# Patient Record
Sex: MALE | Race: WHITE | NOT HISPANIC OR LATINO | URBAN - METROPOLITAN AREA
[De-identification: names, ages, dates, MRNs, and addresses within clinical notes are randomized per-mention and may not be internally consistent; named-entity substitution may affect disease eponyms.]

---

## 2020-01-21 ENCOUNTER — EMERGENCY (EMERGENCY)
Facility: HOSPITAL | Age: 55
LOS: 1 days | Discharge: HOME | End: 2020-01-21
Attending: EMERGENCY MEDICINE | Admitting: EMERGENCY MEDICINE
Payer: COMMERCIAL

## 2020-01-21 VITALS
RESPIRATION RATE: 20 BRPM | SYSTOLIC BLOOD PRESSURE: 155 MMHG | OXYGEN SATURATION: 96 % | DIASTOLIC BLOOD PRESSURE: 94 MMHG | WEIGHT: 220.02 LBS | HEART RATE: 146 BPM | HEIGHT: 70 IN | TEMPERATURE: 103 F

## 2020-01-21 LAB
ALBUMIN SERPL ELPH-MCNC: 3.6 G/DL — SIGNIFICANT CHANGE UP (ref 3.4–5)
ALP SERPL-CCNC: 89 U/L — SIGNIFICANT CHANGE UP (ref 40–120)
ALT FLD-CCNC: 34 U/L — SIGNIFICANT CHANGE UP (ref 12–42)
ANION GAP SERPL CALC-SCNC: 13 MMOL/L — SIGNIFICANT CHANGE UP (ref 9–16)
APPEARANCE UR: CLEAR — SIGNIFICANT CHANGE UP
APTT BLD: 30.6 SEC — SIGNIFICANT CHANGE UP (ref 27.5–36.3)
AST SERPL-CCNC: 22 U/L — SIGNIFICANT CHANGE UP (ref 15–37)
BASOPHILS # BLD AUTO: 0.03 K/UL — SIGNIFICANT CHANGE UP (ref 0–0.2)
BASOPHILS NFR BLD AUTO: 0.2 % — SIGNIFICANT CHANGE UP (ref 0–2)
BILIRUB SERPL-MCNC: 0.9 MG/DL — SIGNIFICANT CHANGE UP (ref 0.2–1.2)
BILIRUB UR-MCNC: NEGATIVE — SIGNIFICANT CHANGE UP
BUN SERPL-MCNC: 22 MG/DL — SIGNIFICANT CHANGE UP (ref 7–23)
CALCIUM SERPL-MCNC: 9 MG/DL — SIGNIFICANT CHANGE UP (ref 8.5–10.5)
CHLORIDE SERPL-SCNC: 99 MMOL/L — SIGNIFICANT CHANGE UP (ref 96–108)
CO2 SERPL-SCNC: 23 MMOL/L — SIGNIFICANT CHANGE UP (ref 22–31)
COLOR SPEC: YELLOW — SIGNIFICANT CHANGE UP
CREAT SERPL-MCNC: 1.33 MG/DL — HIGH (ref 0.5–1.3)
D DIMER BLD IA.RAPID-MCNC: 216 NG/ML DDU — SIGNIFICANT CHANGE UP
DIFF PNL FLD: ABNORMAL
EOSINOPHIL # BLD AUTO: 0.03 K/UL — SIGNIFICANT CHANGE UP (ref 0–0.5)
EOSINOPHIL NFR BLD AUTO: 0.2 % — SIGNIFICANT CHANGE UP (ref 0–6)
FLU A RESULT: SIGNIFICANT CHANGE UP
FLU A RESULT: SIGNIFICANT CHANGE UP
FLUAV AG NPH QL: SIGNIFICANT CHANGE UP
FLUBV AG NPH QL: SIGNIFICANT CHANGE UP
GLUCOSE SERPL-MCNC: 96 MG/DL — SIGNIFICANT CHANGE UP (ref 70–99)
GLUCOSE UR QL: NEGATIVE — SIGNIFICANT CHANGE UP
HCT VFR BLD CALC: 39.5 % — SIGNIFICANT CHANGE UP (ref 39–50)
HGB BLD-MCNC: 13.8 G/DL — SIGNIFICANT CHANGE UP (ref 13–17)
IMM GRANULOCYTES NFR BLD AUTO: 0.4 % — SIGNIFICANT CHANGE UP (ref 0–1.5)
INR BLD: 1.17 — HIGH (ref 0.88–1.16)
KETONES UR-MCNC: 15 MG/DL
LACTATE SERPL-SCNC: 1 MMOL/L — SIGNIFICANT CHANGE UP (ref 0.4–2)
LEUKOCYTE ESTERASE UR-ACNC: NEGATIVE — SIGNIFICANT CHANGE UP
LYMPHOCYTES # BLD AUTO: 0.77 K/UL — LOW (ref 1–3.3)
LYMPHOCYTES # BLD AUTO: 4.2 % — LOW (ref 13–44)
MCHC RBC-ENTMCNC: 29.6 PG — SIGNIFICANT CHANGE UP (ref 27–34)
MCHC RBC-ENTMCNC: 34.9 GM/DL — SIGNIFICANT CHANGE UP (ref 32–36)
MCV RBC AUTO: 84.6 FL — SIGNIFICANT CHANGE UP (ref 80–100)
MONOCYTES # BLD AUTO: 0.88 K/UL — SIGNIFICANT CHANGE UP (ref 0–0.9)
MONOCYTES NFR BLD AUTO: 4.8 % — SIGNIFICANT CHANGE UP (ref 2–14)
NEUTROPHILS # BLD AUTO: 16.51 K/UL — HIGH (ref 1.8–7.4)
NEUTROPHILS NFR BLD AUTO: 90.2 % — HIGH (ref 43–77)
NITRITE UR-MCNC: NEGATIVE — SIGNIFICANT CHANGE UP
NRBC # BLD: 0 /100 WBCS — SIGNIFICANT CHANGE UP (ref 0–0)
PH UR: 6 — SIGNIFICANT CHANGE UP (ref 5–8)
PLATELET # BLD AUTO: 377 K/UL — SIGNIFICANT CHANGE UP (ref 150–400)
POTASSIUM SERPL-MCNC: 4.1 MMOL/L — SIGNIFICANT CHANGE UP (ref 3.5–5.3)
POTASSIUM SERPL-SCNC: 4.1 MMOL/L — SIGNIFICANT CHANGE UP (ref 3.5–5.3)
PROT SERPL-MCNC: 8.1 G/DL — SIGNIFICANT CHANGE UP (ref 6.4–8.2)
PROT UR-MCNC: NEGATIVE MG/DL — SIGNIFICANT CHANGE UP
PROTHROM AB SERPL-ACNC: 12.9 SEC — SIGNIFICANT CHANGE UP (ref 10–12.9)
RBC # BLD: 4.67 M/UL — SIGNIFICANT CHANGE UP (ref 4.2–5.8)
RBC # FLD: 12.1 % — SIGNIFICANT CHANGE UP (ref 10.3–14.5)
RSV RESULT: SIGNIFICANT CHANGE UP
RSV RNA RESP QL NAA+PROBE: SIGNIFICANT CHANGE UP
SODIUM SERPL-SCNC: 135 MMOL/L — SIGNIFICANT CHANGE UP (ref 132–145)
SP GR SPEC: <=1.005 — SIGNIFICANT CHANGE UP (ref 1–1.03)
UROBILINOGEN FLD QL: 0.2 E.U./DL — SIGNIFICANT CHANGE UP
WBC # BLD: 18.29 K/UL — HIGH (ref 3.8–10.5)
WBC # FLD AUTO: 18.29 K/UL — HIGH (ref 3.8–10.5)

## 2020-01-21 PROCEDURE — 93010 ELECTROCARDIOGRAM REPORT: CPT

## 2020-01-21 PROCEDURE — 71046 X-RAY EXAM CHEST 2 VIEWS: CPT | Mod: 26

## 2020-01-21 PROCEDURE — 99218: CPT | Mod: 25

## 2020-01-21 RX ORDER — ACETAMINOPHEN 500 MG
975 TABLET ORAL ONCE
Refills: 0 | Status: COMPLETED | OUTPATIENT
Start: 2020-01-21 | End: 2020-01-21

## 2020-01-21 RX ORDER — SODIUM CHLORIDE 9 MG/ML
1000 INJECTION INTRAMUSCULAR; INTRAVENOUS; SUBCUTANEOUS ONCE
Refills: 0 | Status: COMPLETED | OUTPATIENT
Start: 2020-01-21 | End: 2020-01-21

## 2020-01-21 RX ORDER — CEFTRIAXONE 500 MG/1
1000 INJECTION, POWDER, FOR SOLUTION INTRAMUSCULAR; INTRAVENOUS ONCE
Refills: 0 | Status: COMPLETED | OUTPATIENT
Start: 2020-01-21 | End: 2020-01-21

## 2020-01-21 RX ORDER — SODIUM CHLORIDE 9 MG/ML
3000 INJECTION INTRAMUSCULAR; INTRAVENOUS; SUBCUTANEOUS ONCE
Refills: 0 | Status: COMPLETED | OUTPATIENT
Start: 2020-01-21 | End: 2020-01-21

## 2020-01-21 RX ORDER — VANCOMYCIN HCL 1 G
1500 VIAL (EA) INTRAVENOUS ONCE
Refills: 0 | Status: COMPLETED | OUTPATIENT
Start: 2020-01-21 | End: 2020-01-21

## 2020-01-21 RX ADMIN — SODIUM CHLORIDE 3000 MILLILITER(S): 9 INJECTION INTRAMUSCULAR; INTRAVENOUS; SUBCUTANEOUS at 19:56

## 2020-01-21 RX ADMIN — CEFTRIAXONE 100 MILLIGRAM(S): 500 INJECTION, POWDER, FOR SOLUTION INTRAMUSCULAR; INTRAVENOUS at 19:46

## 2020-01-21 RX ADMIN — SODIUM CHLORIDE 250 MILLILITER(S): 9 INJECTION INTRAMUSCULAR; INTRAVENOUS; SUBCUTANEOUS at 22:48

## 2020-01-21 RX ADMIN — Medication 975 MILLIGRAM(S): at 19:57

## 2020-01-21 RX ADMIN — CEFTRIAXONE 1000 MILLIGRAM(S): 500 INJECTION, POWDER, FOR SOLUTION INTRAMUSCULAR; INTRAVENOUS at 20:49

## 2020-01-21 RX ADMIN — SODIUM CHLORIDE 3000 MILLILITER(S): 9 INJECTION INTRAMUSCULAR; INTRAVENOUS; SUBCUTANEOUS at 20:49

## 2020-01-21 RX ADMIN — SODIUM CHLORIDE 1000 MILLILITER(S): 9 INJECTION INTRAMUSCULAR; INTRAVENOUS; SUBCUTANEOUS at 22:48

## 2020-01-21 RX ADMIN — Medication 1500 MILLIGRAM(S): at 22:29

## 2020-01-21 RX ADMIN — Medication 975 MILLIGRAM(S): at 19:47

## 2020-01-21 RX ADMIN — Medication 300 MILLIGRAM(S): at 20:59

## 2020-01-21 NOTE — ED PROVIDER NOTE - SKIN, MLM
Skin normal color for race, warm, dry and intact. superficial abrasion over left lateral malleolus with 10x8cm of surrounding erythema, swelling, and warmth, noncircumferential, no crepitus. outlined.

## 2020-01-21 NOTE — ED CDU PROVIDER INITIAL DAY NOTE - MEDICAL DECISION MAKING DETAILS
pt presents c/o LLE redness with fever. brought into ED septic with fever and tachycardia. sepsis initiated. labs significant for elevated WBC but otherwise nonacute. given vanco/ceftriaxone and sepsis fluids. flu negative. cxr negative.

## 2020-01-21 NOTE — ED CDU PROVIDER INITIAL DAY NOTE - PROGRESS NOTE DETAILS
pain well controlled, cellulitic site contained within marked region, given LLE cellulitis, will change IV abx to vanco/unasyn, dose of toradol, will repeat labs and continue to monitor

## 2020-01-21 NOTE — ED PROVIDER NOTE - DIAGNOSTIC INTERPRETATION
Chest x-ray interpreted by KYLIE PA: Kristan White  Findings: heart size normal, possible right middle lobe infiltrates, lungs fully expanded, soft tissues appear normal. Chest x-ray interpreted by KYLIE PA: Kristan White  Findings: heart size normal, no infiltrates, lungs fully expanded, soft tissues appear normal.

## 2020-01-21 NOTE — ED PROVIDER NOTE - ATTENDING CONTRIBUTION TO CARE
Patient presenting with LLE redness x a few days. Febrile. Tachy. + ~  hand sized area of light red cellulitis to LLE, no crepitus, no fluctuance, no tenderness above redness. Sepsis [place] initiated. Lactate 1.0, HR coming down. Given CTX/Vanco. Placed on obs for second doses and clinical reassessment.

## 2020-01-21 NOTE — ED PROVIDER NOTE - MUSCULOSKELETAL, MLM
Spine appears normal, range of motion is not limited, no muscle or joint tenderness. no calf tenderness. 2+ dp/pt pulses equal bilat.

## 2020-01-21 NOTE — ED PROVIDER NOTE - OBJECTIVE STATEMENT
53yo M with h/o HTN (on amlodipine) presents today c/o left leg redness and swelling. pt was seen at urgent care where he was noted to be tachycardic and have low SpO2 and was sent to the ED. pt notes the redness started yesterday after picking at some dry skin on his foot. today he developed fever and chills along with tenderness to the area. pt denies any IVDU or injuries. denies any recent travel, surgery, h/o dvt or pe, ca, smoking, any other concerns. denies nausea, vomiting, cp, sob. does note that he has been fighting a cold for the last several days with productive cough.

## 2020-01-21 NOTE — ED PROVIDER NOTE - CARE PLAN
Principal Discharge DX:	Sepsis, due to unspecified organism, unspecified whether acute organ dysfunction present  Secondary Diagnosis:	Cellulitis of left lower extremity

## 2020-01-21 NOTE — ED CDU PROVIDER INITIAL DAY NOTE - DIAGNOSTIC INTERPRETATION
Chest x-ray interpreted by KYLIE PA: Kristan White  Findings: heart size normal, no infiltrates, lungs fully expanded, soft tissues appear normal.

## 2020-01-21 NOTE — ED ADULT NURSE NOTE - CHPI ED NUR SYMPTOMS NEG
no stiffness/no deformity/no tingling/no difficulty bearing weight/no abrasion/no bruising/no weakness/no back pain/no numbness

## 2020-01-21 NOTE — ED PROVIDER NOTE - CLINICAL SUMMARY MEDICAL DECISION MAKING FREE TEXT BOX
pt presents c/o fever, chills, cough, and cellulitis to ankle. pt told by UC that because he was tachycardic he may have a PE although more likely this is related to his fever as pt was septic upon arrival, likely 2/2 cellulitis, although cxr shows possible infiltrate as well. pt given ceftriaxone and vanco as well as weight based fluids. lactate negative. HR improving with fluids. BP stable. fever improved with tylenol. will obs for symptomatic relief, monitoring of infection progress, second round of IV abx.

## 2020-01-21 NOTE — ED PROVIDER NOTE - PROGRESS NOTE DETAILS
rechecked pt - discussed all results. cxr pending. negative lactate. hr improving after fluids. given that pt met sepsis criteria, will obs for second dose of IV abx and reassessment.

## 2020-01-22 VITALS
TEMPERATURE: 98 F | OXYGEN SATURATION: 98 % | DIASTOLIC BLOOD PRESSURE: 70 MMHG | SYSTOLIC BLOOD PRESSURE: 130 MMHG | HEART RATE: 80 BPM | RESPIRATION RATE: 18 BRPM

## 2020-01-22 LAB
BASOPHILS # BLD AUTO: 0.03 K/UL — SIGNIFICANT CHANGE UP (ref 0–0.2)
BASOPHILS NFR BLD AUTO: 0.2 % — SIGNIFICANT CHANGE UP (ref 0–2)
CRP SERPL-MCNC: >12 MG/DL — HIGH (ref 0–0.9)
EOSINOPHIL # BLD AUTO: 0.21 K/UL — SIGNIFICANT CHANGE UP (ref 0–0.5)
EOSINOPHIL NFR BLD AUTO: 1.4 % — SIGNIFICANT CHANGE UP (ref 0–6)
HCT VFR BLD CALC: 34.9 % — LOW (ref 39–50)
HGB BLD-MCNC: 12.1 G/DL — LOW (ref 13–17)
IMM GRANULOCYTES NFR BLD AUTO: 0.3 % — SIGNIFICANT CHANGE UP (ref 0–1.5)
LYMPHOCYTES # BLD AUTO: 1.54 K/UL — SIGNIFICANT CHANGE UP (ref 1–3.3)
LYMPHOCYTES # BLD AUTO: 9.9 % — LOW (ref 13–44)
MCHC RBC-ENTMCNC: 29.9 PG — SIGNIFICANT CHANGE UP (ref 27–34)
MCHC RBC-ENTMCNC: 34.7 GM/DL — SIGNIFICANT CHANGE UP (ref 32–36)
MCV RBC AUTO: 86.2 FL — SIGNIFICANT CHANGE UP (ref 80–100)
MONOCYTES # BLD AUTO: 1.13 K/UL — HIGH (ref 0–0.9)
MONOCYTES NFR BLD AUTO: 7.3 % — SIGNIFICANT CHANGE UP (ref 2–14)
NEUTROPHILS # BLD AUTO: 12.53 K/UL — HIGH (ref 1.8–7.4)
NEUTROPHILS NFR BLD AUTO: 80.9 % — HIGH (ref 43–77)
NRBC # BLD: 0 /100 WBCS — SIGNIFICANT CHANGE UP (ref 0–0)
PLATELET # BLD AUTO: 283 K/UL — SIGNIFICANT CHANGE UP (ref 150–400)
RBC # BLD: 4.05 M/UL — LOW (ref 4.2–5.8)
RBC # FLD: 12.2 % — SIGNIFICANT CHANGE UP (ref 10.3–14.5)
WBC # BLD: 15.49 K/UL — HIGH (ref 3.8–10.5)
WBC # FLD AUTO: 15.49 K/UL — HIGH (ref 3.8–10.5)

## 2020-01-22 PROCEDURE — 99217: CPT | Mod: 25

## 2020-01-22 RX ORDER — IBUPROFEN 200 MG
1 TABLET ORAL
Qty: 20 | Refills: 0
Start: 2020-01-22

## 2020-01-22 RX ORDER — AMPICILLIN SODIUM AND SULBACTAM SODIUM 250; 125 MG/ML; MG/ML
3 INJECTION, POWDER, FOR SUSPENSION INTRAMUSCULAR; INTRAVENOUS EVERY 6 HOURS
Refills: 0 | Status: DISCONTINUED | OUTPATIENT
Start: 2020-01-22 | End: 2020-02-04

## 2020-01-22 RX ORDER — KETOROLAC TROMETHAMINE 30 MG/ML
30 SYRINGE (ML) INJECTION ONCE
Refills: 0 | Status: DISCONTINUED | OUTPATIENT
Start: 2020-01-22 | End: 2020-01-22

## 2020-01-22 RX ORDER — VANCOMYCIN HCL 1 G
1500 VIAL (EA) INTRAVENOUS EVERY 12 HOURS
Refills: 0 | Status: DISCONTINUED | OUTPATIENT
Start: 2020-01-22 | End: 2020-02-04

## 2020-01-22 RX ADMIN — AMPICILLIN SODIUM AND SULBACTAM SODIUM 200 GRAM(S): 250; 125 INJECTION, POWDER, FOR SUSPENSION INTRAMUSCULAR; INTRAVENOUS at 01:41

## 2020-01-22 RX ADMIN — Medication 30 MILLIGRAM(S): at 03:13

## 2020-01-22 RX ADMIN — Medication 300 MILLIGRAM(S): at 05:00

## 2020-01-22 RX ADMIN — AMPICILLIN SODIUM AND SULBACTAM SODIUM 200 GRAM(S): 250; 125 INJECTION, POWDER, FOR SUSPENSION INTRAMUSCULAR; INTRAVENOUS at 06:36

## 2020-01-22 RX ADMIN — Medication 1500 MILLIGRAM(S): at 06:36

## 2020-01-22 RX ADMIN — AMPICILLIN SODIUM AND SULBACTAM SODIUM 3 GRAM(S): 250; 125 INJECTION, POWDER, FOR SUSPENSION INTRAMUSCULAR; INTRAVENOUS at 04:23

## 2020-01-22 NOTE — ED ADULT NURSE REASSESSMENT NOTE - NS ED NURSE REASSESS COMMENT FT1
Received patient from [facility]. Currently resting in stretcher, in nad, respirations even bilaterally. VSS, IV ABX ongoing. Denies pain, fevers, chills. Steady gait with ambulation to bathroom.

## 2020-01-22 NOTE — ED CDU PROVIDER DISPOSITION NOTE - CARE PROVIDERS DIRECT ADDRESSES
[email or web address]

## 2020-01-22 NOTE — ED CDU PROVIDER DISPOSITION NOTE - PATIENT PORTAL LINK FT
You can access the FollowMyHealth Patient Portal offered by [facility] by registering at the following website: [email or web address]. By joining [facility]’s FollowMyHealth portal, you will also be able to view your health information using other applications (apps) compatible with our system.

## 2020-01-22 NOTE — ED CDU PROVIDER DISPOSITION NOTE - CLINICAL COURSE
53yo M with h/o HTN (on amlodipine) presents today c/o left leg redness and swelling. pt was seen at urgent care where he was noted to be tachycardic and have low SpO2 and was sent to the ED. pt notes the redness started yesterday after picking at some dry skin on his foot. today he developed fever and chills along with tenderness to the area. pt denies any IVDU or injuries. Noted to have leukocytosis to 18K with markedly elevated CRP, s/p vanco/ceftriaxone for empiric sepsis coverage initially, placed in obs s/p serial IV abx - vanco/unasyn with NSAIDs and fluids, improved with adequate control of pain, repeat labs improved, AFVSS and non toxic appearing, site marked and with no expansion of erythema noted, will dc home on doxy/duricef, strict return precautions discussed, f/u with PMD, pt verbalized understanding

## 2020-01-23 LAB
CULTURE RESULTS: NO GROWTH — SIGNIFICANT CHANGE UP
SPECIMEN SOURCE: SIGNIFICANT CHANGE UP

## 2020-01-24 ENCOUNTER — INPATIENT (INPATIENT)
Facility: HOSPITAL | Age: 55
LOS: 2 days | Discharge: HOME | DRG: 872 | End: 2020-01-27
Payer: COMMERCIAL

## 2020-01-24 VITALS
OXYGEN SATURATION: 97 % | RESPIRATION RATE: 18 BRPM | HEIGHT: 70 IN | WEIGHT: 220.02 LBS | DIASTOLIC BLOOD PRESSURE: 104 MMHG | HEART RATE: 130 BPM | TEMPERATURE: 99 F | SYSTOLIC BLOOD PRESSURE: 164 MMHG

## 2020-01-24 DIAGNOSIS — L03.119 CELLULITIS OF UNSPECIFIED PART OF LIMB: ICD-10-CM

## 2020-01-24 DIAGNOSIS — Z91.89 OTHER SPECIFIED PERSONAL RISK FACTORS, NOT ELSEWHERE CLASSIFIED: ICD-10-CM

## 2020-01-24 DIAGNOSIS — A41.9 SEPSIS, UNSPECIFIED ORGANISM: ICD-10-CM

## 2020-01-24 DIAGNOSIS — I10 ESSENTIAL (PRIMARY) HYPERTENSION: ICD-10-CM

## 2020-01-24 DIAGNOSIS — R63.8 OTHER SYMPTOMS AND SIGNS CONCERNING FOOD AND FLUID INTAKE: ICD-10-CM

## 2020-01-24 DIAGNOSIS — Z29.9 ENCOUNTER FOR PROPHYLACTIC MEASURES, UNSPECIFIED: ICD-10-CM

## 2020-01-24 DIAGNOSIS — R79.89 OTHER SPECIFIED ABNORMAL FINDINGS OF BLOOD CHEMISTRY: ICD-10-CM

## 2020-01-24 DIAGNOSIS — J10.1 INFLUENZA DUE TO OTHER IDENTIFIED INFLUENZA VIRUS WITH OTHER RESPIRATORY MANIFESTATIONS: ICD-10-CM

## 2020-01-24 LAB
ALBUMIN SERPL ELPH-MCNC: 3.1 G/DL — LOW (ref 3.4–5)
ALP SERPL-CCNC: 89 U/L — SIGNIFICANT CHANGE UP (ref 40–120)
ALT FLD-CCNC: 35 U/L — SIGNIFICANT CHANGE UP (ref 12–42)
ANION GAP SERPL CALC-SCNC: 12 MMOL/L — SIGNIFICANT CHANGE UP (ref 9–16)
AST SERPL-CCNC: 22 U/L — SIGNIFICANT CHANGE UP (ref 15–37)
BASOPHILS # BLD AUTO: 0.02 K/UL — SIGNIFICANT CHANGE UP (ref 0–0.2)
BASOPHILS NFR BLD AUTO: 0.3 % — SIGNIFICANT CHANGE UP (ref 0–2)
BILIRUB SERPL-MCNC: 0.4 MG/DL — SIGNIFICANT CHANGE UP (ref 0.2–1.2)
BUN SERPL-MCNC: 13 MG/DL — SIGNIFICANT CHANGE UP (ref 7–23)
CALCIUM SERPL-MCNC: 8.8 MG/DL — SIGNIFICANT CHANGE UP (ref 8.5–10.5)
CHLORIDE SERPL-SCNC: 104 MMOL/L — SIGNIFICANT CHANGE UP (ref 96–108)
CO2 SERPL-SCNC: 25 MMOL/L — SIGNIFICANT CHANGE UP (ref 22–31)
CREAT SERPL-MCNC: 1.28 MG/DL — SIGNIFICANT CHANGE UP (ref 0.5–1.3)
EOSINOPHIL # BLD AUTO: 0.25 K/UL — SIGNIFICANT CHANGE UP (ref 0–0.5)
EOSINOPHIL NFR BLD AUTO: 3.4 % — SIGNIFICANT CHANGE UP (ref 0–6)
FLU A RESULT: DETECTED
FLU A RESULT: DETECTED
FLUAV AG NPH QL: DETECTED
FLUBV AG NPH QL: SIGNIFICANT CHANGE UP
GLUCOSE SERPL-MCNC: 112 MG/DL — HIGH (ref 70–99)
HCT VFR BLD CALC: 35.6 % — LOW (ref 39–50)
HGB BLD-MCNC: 12.3 G/DL — LOW (ref 13–17)
IMM GRANULOCYTES NFR BLD AUTO: 0.5 % — SIGNIFICANT CHANGE UP (ref 0–1.5)
LACTATE SERPL-SCNC: 1.5 MMOL/L — SIGNIFICANT CHANGE UP (ref 0.4–2)
LYMPHOCYTES # BLD AUTO: 0.56 K/UL — LOW (ref 1–3.3)
LYMPHOCYTES # BLD AUTO: 7.5 % — LOW (ref 13–44)
MCHC RBC-ENTMCNC: 29.6 PG — SIGNIFICANT CHANGE UP (ref 27–34)
MCHC RBC-ENTMCNC: 34.6 GM/DL — SIGNIFICANT CHANGE UP (ref 32–36)
MCV RBC AUTO: 85.8 FL — SIGNIFICANT CHANGE UP (ref 80–100)
MONOCYTES # BLD AUTO: 0.87 K/UL — SIGNIFICANT CHANGE UP (ref 0–0.9)
MONOCYTES NFR BLD AUTO: 11.7 % — SIGNIFICANT CHANGE UP (ref 2–14)
NEUTROPHILS # BLD AUTO: 5.68 K/UL — SIGNIFICANT CHANGE UP (ref 1.8–7.4)
NEUTROPHILS NFR BLD AUTO: 76.6 % — SIGNIFICANT CHANGE UP (ref 43–77)
NRBC # BLD: 0 /100 WBCS — SIGNIFICANT CHANGE UP (ref 0–0)
PLATELET # BLD AUTO: 321 K/UL — SIGNIFICANT CHANGE UP (ref 150–400)
POTASSIUM SERPL-MCNC: 4 MMOL/L — SIGNIFICANT CHANGE UP (ref 3.5–5.3)
POTASSIUM SERPL-SCNC: 4 MMOL/L — SIGNIFICANT CHANGE UP (ref 3.5–5.3)
PROT SERPL-MCNC: 7.5 G/DL — SIGNIFICANT CHANGE UP (ref 6.4–8.2)
RBC # BLD: 4.15 M/UL — LOW (ref 4.2–5.8)
RBC # FLD: 12.1 % — SIGNIFICANT CHANGE UP (ref 10.3–14.5)
RSV RESULT: SIGNIFICANT CHANGE UP
RSV RNA RESP QL NAA+PROBE: SIGNIFICANT CHANGE UP
SODIUM SERPL-SCNC: 141 MMOL/L — SIGNIFICANT CHANGE UP (ref 132–145)
WBC # BLD: 7.42 K/UL — SIGNIFICANT CHANGE UP (ref 3.8–10.5)
WBC # FLD AUTO: 7.42 K/UL — SIGNIFICANT CHANGE UP (ref 3.8–10.5)

## 2020-01-24 PROCEDURE — 99223 1ST HOSP IP/OBS HIGH 75: CPT | Mod: GC

## 2020-01-24 PROCEDURE — 71045 X-RAY EXAM CHEST 1 VIEW: CPT | Mod: 26

## 2020-01-24 PROCEDURE — 99285 EMERGENCY DEPT VISIT HI MDM: CPT

## 2020-01-24 RX ORDER — KETOROLAC TROMETHAMINE 30 MG/ML
30 SYRINGE (ML) INJECTION ONCE
Refills: 0 | Status: DISCONTINUED | OUTPATIENT
Start: 2020-01-24 | End: 2020-01-24

## 2020-01-24 RX ORDER — ACETAMINOPHEN 500 MG
975 TABLET ORAL ONCE
Refills: 0 | Status: COMPLETED | OUTPATIENT
Start: 2020-01-24 | End: 2020-01-24

## 2020-01-24 RX ORDER — AMPICILLIN SODIUM AND SULBACTAM SODIUM 250; 125 MG/ML; MG/ML
3 INJECTION, POWDER, FOR SUSPENSION INTRAMUSCULAR; INTRAVENOUS ONCE
Refills: 0 | Status: COMPLETED | OUTPATIENT
Start: 2020-01-24 | End: 2020-01-24

## 2020-01-24 RX ORDER — ACETAMINOPHEN 500 MG
650 TABLET ORAL EVERY 6 HOURS
Refills: 0 | Status: DISCONTINUED | OUTPATIENT
Start: 2020-01-24 | End: 2020-01-27

## 2020-01-24 RX ORDER — VANCOMYCIN HCL 1 G
1500 VIAL (EA) INTRAVENOUS EVERY 12 HOURS
Refills: 0 | Status: DISCONTINUED | OUTPATIENT
Start: 2020-01-25 | End: 2020-01-25

## 2020-01-24 RX ORDER — VANCOMYCIN HCL 1 G
1500 VIAL (EA) INTRAVENOUS ONCE
Refills: 0 | Status: COMPLETED | OUTPATIENT
Start: 2020-01-24 | End: 2020-01-24

## 2020-01-24 RX ORDER — VANCOMYCIN HCL 1 G
1500 VIAL (EA) INTRAVENOUS ONCE
Refills: 0 | Status: DISCONTINUED | OUTPATIENT
Start: 2020-01-24 | End: 2020-01-24

## 2020-01-24 RX ORDER — FAMOTIDINE 10 MG/ML
20 INJECTION INTRAVENOUS ONCE
Refills: 0 | Status: COMPLETED | OUTPATIENT
Start: 2020-01-24 | End: 2020-01-24

## 2020-01-24 RX ORDER — SODIUM CHLORIDE 9 MG/ML
1000 INJECTION INTRAMUSCULAR; INTRAVENOUS; SUBCUTANEOUS
Refills: 0 | Status: DISCONTINUED | OUTPATIENT
Start: 2020-01-24 | End: 2020-01-26

## 2020-01-24 RX ORDER — INFLUENZA VIRUS VACCINE 15; 15; 15; 15 UG/.5ML; UG/.5ML; UG/.5ML; UG/.5ML
0.5 SUSPENSION INTRAMUSCULAR ONCE
Refills: 0 | Status: DISCONTINUED | OUTPATIENT
Start: 2020-01-24 | End: 2020-01-27

## 2020-01-24 RX ORDER — SODIUM CHLORIDE 9 MG/ML
2500 INJECTION INTRAMUSCULAR; INTRAVENOUS; SUBCUTANEOUS ONCE
Refills: 0 | Status: COMPLETED | OUTPATIENT
Start: 2020-01-24 | End: 2020-01-24

## 2020-01-24 RX ADMIN — SODIUM CHLORIDE 100 MILLILITER(S): 9 INJECTION INTRAMUSCULAR; INTRAVENOUS; SUBCUTANEOUS at 22:17

## 2020-01-24 RX ADMIN — Medication 300 MILLIGRAM(S): at 13:31

## 2020-01-24 RX ADMIN — Medication 75 MILLIGRAM(S): at 22:17

## 2020-01-24 RX ADMIN — AMPICILLIN SODIUM AND SULBACTAM SODIUM 200 GRAM(S): 250; 125 INJECTION, POWDER, FOR SUSPENSION INTRAMUSCULAR; INTRAVENOUS at 04:04

## 2020-01-24 RX ADMIN — Medication 975 MILLIGRAM(S): at 09:34

## 2020-01-24 RX ADMIN — Medication 300 MILLIGRAM(S): at 01:57

## 2020-01-24 RX ADMIN — Medication 975 MILLIGRAM(S): at 18:25

## 2020-01-24 RX ADMIN — FAMOTIDINE 20 MILLIGRAM(S): 10 INJECTION INTRAVENOUS at 02:01

## 2020-01-24 RX ADMIN — SODIUM CHLORIDE 2500 MILLILITER(S): 9 INJECTION INTRAMUSCULAR; INTRAVENOUS; SUBCUTANEOUS at 01:58

## 2020-01-24 RX ADMIN — Medication 75 MILLIGRAM(S): at 04:03

## 2020-01-24 RX ADMIN — Medication 975 MILLIGRAM(S): at 01:58

## 2020-01-24 RX ADMIN — Medication 30 MILLIGRAM(S): at 09:34

## 2020-01-24 RX ADMIN — Medication 30 MILLIGRAM(S): at 18:25

## 2020-01-24 RX ADMIN — AMPICILLIN SODIUM AND SULBACTAM SODIUM 200 GRAM(S): 250; 125 INJECTION, POWDER, FOR SUSPENSION INTRAMUSCULAR; INTRAVENOUS at 09:17

## 2020-01-24 NOTE — H&P ADULT - PROBLEM SELECTOR PLAN 4
Pt with hx of HTN, on norvasc Pt with hx of HTN, on norvasc  - c/w home norvasc Pt with hx of HTN, on norvasc 10mg QD and losartan 100mg QD  - c/w home norvasc  - hold losartan given rosaura Pt with elevated Cr on previosu ED visit to 1.33, now improved to 1.28. Unknown baseline. Likely prerenal 2/2 sepsis however pt with hx of R partial nephrectomy 2/2 unspecified renal carcinoma.   - obtain collateral   - urine lytes   - trend cr

## 2020-01-24 NOTE — H&P ADULT - HISTORY OF PRESENT ILLNESS
A 53yo M with h/o HTN (on amlodipine) presents with worsening cellulitis.  pt seen here 3 days ago, dx w/ LLE cellulitis, rec'd IV vanco and unasy, w/ improvement.  pt dc'd home w/ doxy and cephalosporin, states since yesterday, noted fever coming back, and redness to leg has worsened.  also c/o persistent nasal congestion and cough    In ED VS: T 102.7, , /104, RR18 and SpO2 97% on RA. Labs s/f hgb 12.3, Cr 1.28, Influenza A positive. CXR wnl. Pt given 2.5L NS bolus, Vancomycin/Unasyn, tylenol, toradol x2 and admitted to [facility] for further management. A 53 yo M with h/o HTN (on amlodipine), R kidney cancer s/p partial nephrectomy 2014 presents with worsening cellulitis. Patient presented to [facility] ED 3 days ago with symptoms of LLE pain, swelling and redness. Patient with history of eczema and often scratches lower extremities patient first noticed redness around LLE 3 days ago, then developed pain/swelling and fever prompting visit to ED. At the time patient treated with IV van/unasyn and discharged home with Doxy and Cefadroxil. Patient complaint with medications, however noticed increased redness and swelling and spiking fevers. Patient also with persistent cough, nasal congestion for 3 days. ROS+ diarhea NBNB for 1 day. Patient denies CP, palpitations, nausea/vomiting, abd pain.     In ED VS: T 102.7, , /104, RR18 and SpO2 97% on RA. Labs s/f hgb 12.3, Cr 1.28, Influenza A positive. CXR wnl. Pt given 2.5L NS bolus, Vancomycin/Unasyn, tylenol, toradol x2 and admitted to [facility] for further management.

## 2020-01-24 NOTE — ED ADULT NURSE REASSESSMENT NOTE - NS ED NURSE REASSESS COMMENT FT1
pt c/o headache, MD made aware - meds given as ordered. Will reassess. jody leon
Pt denies any complaints at this time, brother at the bedside. Awaiting bed to be clean at [facility] - admitted to 4urs. jody leon
Pt with no complaints at this time, denies any pain/discomfort. In no acute distress - awaiting bed for [facility] transfer. jody leon

## 2020-01-24 NOTE — ED PROVIDER NOTE - PHYSICAL EXAMINATION
Physical Exam  GEN: Awake, alert, non-toxic appearing, NCAT  EYES: full EOMI,  ENT: External inspection normal, normal voice,   HEAD: atraumatic  NECK: FROM neck, supple,   CV: tachycardia  RESP: cta bl, no tachypnea, no hypoxia, no resp distress,  MSK: FROM all 4 extremities, soft compartment to LLE  SKIN: well demarcated erythema, w/ erythematous streaking, appears that the erythema has extended beyond the marked border, no crepitus, no gangrene, cap refill < 2 sec, pedal pulses intact  NEURO: Oriented x3, CN 2-12 grossly intact, normal motor, normal sensory

## 2020-01-24 NOTE — ED ADULT NURSE NOTE - OBJECTIVE STATEMENT
pt presents to the ED with complaints of fever chills and flu like symptoms-   currently being treated for left lower leg cellulitis and noticed today area of redness has widened

## 2020-01-24 NOTE — H&P ADULT - ASSESSMENT
A 55 yo M with h/o HTN (on amlodipine), R kidney cancer s/p partial nephrectomy 2014 presents with worsening cellulitis. Patient presented to [facility] ED 3 days ago with symptoms of LLE pain, swelling and redness. Meeting sepsis criteria on admission 2/2 cellulitis and Influenza A

## 2020-01-24 NOTE — H&P ADULT - ATTENDING COMMENTS
patient seen and examined a/f LLEXT cellulitis   reviewed pertinent data, h&p    PE  findings as above, pt in NAD, erythema receding , within marked borders    a/p:   1. sepsis in setting of LLEXT cellulitis and FLU : on vancomycin, followup ctxs, CT LLEXT; c/w tamiflu.   2. monitor renal fxn , restart losartan prn when AJAY resolves.     rest of plan as above

## 2020-01-24 NOTE — ED PROVIDER NOTE - CLINICAL SUMMARY MEDICAL DECISION MAKING FREE TEXT BOX
worsening cellulitis on PO abx x 48 hours, will repeat xray and flu swab given URI sx, though likely source of infection is from LLE, soft compartment w/o crepitus, denies any ivdu/instrumentation, low suspicion for nec fasc, will rpt labs, restart w/ previous iv regimen of vanco and unasyn, admission

## 2020-01-24 NOTE — ED PROVIDER NOTE - OBJECTIVE STATEMENT
54 yom pw 54 yom pw fever, worsening cellulitis.  pt seen here 3 days ago, dx w/ LLE cellulitis, rec'd IV vanco and unasy, w/ improvement.  pt dc'd home w/ doxy and cephalosporin, states since yesterday, noted fever coming back, and redness to leg has worsened.  also c/o persistent nasal congestion and cough (neg flu and xray during last visit).  denies ivdu.  reported scratching his foot prior to onset of cellulitis.

## 2020-01-24 NOTE — H&P ADULT - PROBLEM SELECTOR PLAN 7
1) PCP Contacted on Admission: (Y/N) --> Name & Phone #:  2) Date of Contact with PCP:  3) PCP Contacted at Discharge: (Y/N, N/A)  4) Summary of Handoff Given to PCP:   5) Post-Discharge Appointment Date and Location: 1) PCP Contacted on Admission: (Y/N) --> Name & Phone #: Dr. Yi   2) Date of Contact with PCP:  3) PCP Contacted at Discharge: (Y/N, N/A)  4) Summary of Handoff Given to PCP:   5) Post-Discharge Appointment Date and Location: DVT: Improve 0  GI: none     FULL CODE  RMF F: 100cc/hr   E: replete prn   N: DASH

## 2020-01-24 NOTE — H&P ADULT - PROBLEM SELECTOR PLAN 1
Pt meeting 2/4 SIRS (fever 102 and HR>90) presents with worsening LLE rash and diagnosed with cellulitis 3 days ago, pt discharged on Doxy and cefadroxil. Pt now presents with worsened swelling and fevers. Patient also complains of persistent nasal congestion and cough. Neg flu 3days ago however now +Influenza A. Sepsis maybe 2/2 worsened cellulitis vs influenza. Pt denies IVDU, low suspicion of abcess/ nec fasc.   - s/p 2.5L NS bolus in ED  - s/p Vanc/unsyn   - will continue with vancomycin   - will treat influenza A with tamiflu   - blood and urine cx (1/22) NGTD  - f/up repeat blood cx 1/24 Pt meeting 2/4 SIRS (fever 102 and HR>90) presents with worsening LLE rash and diagnosed with cellulitis 3 days ago, pt discharged on Doxy and cefadroxil. Pt now presents with worsened swelling and fevers. Patient also complains of persistent nasal congestion and cough. Neg flu 3days ago however now +Influenza A. Sepsis maybe 2/2 worsened cellulitis vs abcess vs influenza. Pt denies IVDU.  - s/p 2.5L NS bolus in ED  - s/p Vanc/unsyn   - will continue with vancomycin   - will treat influenza A with tamiflu   - blood and urine cx (1/22) NGTD  - f/up repeat blood cx 1/24  - CRP >12 on previous Ed visit, with area of fluctuance.   - f/up CT LLE to r/o abcess Pt meeting 2/4 SIRS (fever 102 and HR>90) presents with worsening LLE rash and diagnosed with cellulitis 3 days ago, pt discharged on Doxy and cefadroxil. Pt now presents with worsened swelling and fevers. Patient also complains of persistent nasal congestion and cough. Neg flu 3days ago however now +Influenza A. Sepsis maybe 2/2 worsened cellulitis vs abcess vs influenza. Pt denies IVDU.  - s/p 2.5L NS bolus in ED  - s/p Vanc/unsyn   - will continue with vancomycin   - will treat influenza A with tamiflu   - blood and urine cx (1/22) NGTD  - f/up repeat blood cx 1/24  - CRP >12 on previous Ed visit, with area of fluctuance.   - f/up CT LLE to r/o abcess  - f/up HIV

## 2020-01-24 NOTE — H&P ADULT - PROBLEM SELECTOR PLAN 5
F: s/p 2.5L NS bolus   E: replete prn   N: DASH F: 100cc/hr   E: replete prn   N: DASH Pt with elevated Cr on previosu ED visit to 1.33, now improved to 1.28. Unknown baseline. Likely prerenal 2/2 sepsis however pt with hx of R partial nephrectomy 2/2 unspecified renal carcinoma.   - obtain collateral   - urine lytes   - trend cr Pt with hx of HTN, on norvasc 10mg QD and losartan 100mg QD  - c/w home norvasc  - hold losartan given rosaura

## 2020-01-24 NOTE — H&P ADULT - NSHPPHYSICALEXAM_GEN_ALL_CORE
.  VITAL SIGNS:  T(C): 36.4 (01-24-20 @ 20:02), Max: 39.5 (01-24-20 @ 17:41)  T(F): 97.5 (01-24-20 @ 20:02), Max: 103.1 (01-24-20 @ 17:41)  HR: 99 (01-24-20 @ 20:02) (99 - 130)  BP: 138/79 (01-24-20 @ 20:02) (138/79 - 164/104)  BP(mean): --  RR: 18 (01-24-20 @ 20:02) (16 - 18)  SpO2: 97% (01-24-20 @ 20:02) (95% - 98%)  Wt(kg): --    PHYSICAL EXAM:    Constitutional: WDWN resting comfortably in bed; NAD  Head: NC/AT  Eyes: PERRL, EOMI, anicteric sclera  ENT: no nasal discharge; uvula midline, no oropharyngeal erythema or exudates; MMM  Neck: supple; no JVD or thyromegaly  Respiratory: CTA B/L; no W/R/R, no retractions  Cardiac: +S1/S2; RRR; no M/R/G; PMI non-displaced  Gastrointestinal: soft, NT/ND; no rebound or guarding; +BSx4  Genitourinary: normal external genitalia  Back: spine midline, no bony tenderness or step-offs; no CVAT B/L  Extremities: WWP, no clubbing or cyanosis; no peripheral edema  Musculoskeletal: NROM x4; no joint swelling, tenderness or erythema  Vascular: 2+ radial, femoral, DP/PT pulses B/L  Dermatologic: skin warm, dry and intact; no rashes, wounds, or scars  Lymphatic: no submandibular or cervical LAD  Neurologic: AAOx3; CNII-XII grossly intact; no focal deficits  Psychiatric: affect and characteristics of appearance, verbalizations, behaviors are appropriate .  VITAL SIGNS:  T(C): 36.4 (01-24-20 @ 20:02), Max: 39.5 (01-24-20 @ 17:41)  T(F): 97.5 (01-24-20 @ 20:02), Max: 103.1 (01-24-20 @ 17:41)  HR: 99 (01-24-20 @ 20:02) (99 - 130)  BP: 138/79 (01-24-20 @ 20:02) (138/79 - 164/104)  BP(mean): --  RR: 18 (01-24-20 @ 20:02) (16 - 18)  SpO2: 97% (01-24-20 @ 20:02) (95% - 98%)  Wt(kg): --    PHYSICAL EXAM:    Constitutional: WDWN resting comfortably in bed; NAD  Head: NC/AT  Eyes: PERRL, EOMI, anicteric sclera  ENT: no nasal discharge; uvula midline, no oropharyngeal erythema or exudates; MMM  Respiratory: CTA B/L; no W/R/R, no retractions  Cardiac: +S1/S2; RRR; no M/R/G; PMI non-displaced  Gastrointestinal: soft, NT/ND; no rebound or guarding; +BSx4  Extremities: LLE +erythema below knee and above malleuolus, +swelling, Small area of fluctuance on lateral aspect, slightly tender to palpations, no ulceration/purulence/drainage.   Neurologic: AAOx3; CNII-XII grossly intact; no focal deficits

## 2020-01-24 NOTE — H&P ADULT - PROBLEM SELECTOR PLAN 6
DVT: Improve 0  GI: none     FULL CODE  RMF F: 100cc/hr   E: replete prn   N: DASH ADDENDUM: unknown baseline,  monitor H/H; check iron studies

## 2020-01-24 NOTE — H&P ADULT - NSHPLABSRESULTS_GEN_ALL_CORE
.  LABS:                         12.3   7.42  )-----------( 321      ( 24 Jan 2020 01:55 )             35.6     01-24    141  |  104  |  13  ----------------------------<  112<H>  4.0   |  25  |  1.28    Ca    8.8      24 Jan 2020 01:55    TPro  7.5  /  Alb  3.1<L>  /  TBili  0.4  /  DBili  x   /  AST  22  /  ALT  35  /  AlkPhos  89  01-24                  RADIOLOGY, EKG & ADDITIONAL TESTS: Reviewed.

## 2020-01-24 NOTE — H&P ADULT - PROBLEM SELECTOR PLAN 8
1) PCP Contacted on Admission: (Y/N) --> Name & Phone #: Dr. Yi   2) Date of Contact with PCP:  3) PCP Contacted at Discharge: (Y/N, N/A)  4) Summary of Handoff Given to PCP:   5) Post-Discharge Appointment Date and Location: DVT: Improve 0  GI: none     FULL CODE  RMF

## 2020-01-24 NOTE — ED PROVIDER NOTE - CARE PLAN
Principal Discharge DX:	Cellulitis of lower leg  Secondary Diagnosis:	Fever  Secondary Diagnosis:	Influenza A

## 2020-01-24 NOTE — H&P ADULT - PROBLEM SELECTOR PLAN 9
1) PCP Contacted on Admission: (Y/N) --> Name & Phone #: Dr. Yi   2) Date of Contact with PCP:  3) PCP Contacted at Discharge: (Y/N, N/A)  4) Summary of Handoff Given to PCP:   5) Post-Discharge Appointment Date and Location:

## 2020-01-24 NOTE — H&P ADULT - PROBLEM SELECTOR PLAN 2
Plan as above   - c/w vancomycin Plan as above   - c/w vancomycin  - CRP >12 on previous Ed visit, with area of fluctuance.   - f/up ESR  - f/up CT LLE to r/o abcess

## 2020-01-25 DIAGNOSIS — Z90.5 ACQUIRED ABSENCE OF KIDNEY: Chronic | ICD-10-CM

## 2020-01-25 DIAGNOSIS — D64.9 ANEMIA, UNSPECIFIED: ICD-10-CM

## 2020-01-25 DIAGNOSIS — N17.9 ACUTE KIDNEY FAILURE, UNSPECIFIED: ICD-10-CM

## 2020-01-25 LAB
ANION GAP SERPL CALC-SCNC: 15 MMOL/L — SIGNIFICANT CHANGE UP (ref 5–17)
BUN SERPL-MCNC: 6 MG/DL — LOW (ref 7–23)
CALCIUM SERPL-MCNC: 8.6 MG/DL — SIGNIFICANT CHANGE UP (ref 8.4–10.5)
CHLORIDE SERPL-SCNC: 103 MMOL/L — SIGNIFICANT CHANGE UP (ref 96–108)
CO2 SERPL-SCNC: 22 MMOL/L — SIGNIFICANT CHANGE UP (ref 22–31)
CREAT SERPL-MCNC: 0.97 MG/DL — SIGNIFICANT CHANGE UP (ref 0.5–1.3)
ERYTHROCYTE [SEDIMENTATION RATE] IN BLOOD: 70 MM/HR — HIGH
FERRITIN SERPL-MCNC: 294 NG/ML — SIGNIFICANT CHANGE UP (ref 30–400)
GLUCOSE SERPL-MCNC: 91 MG/DL — SIGNIFICANT CHANGE UP (ref 70–99)
HCT VFR BLD CALC: 34.8 % — LOW (ref 39–50)
HCV AB S/CO SERPL IA: 0.11 S/CO — SIGNIFICANT CHANGE UP
HCV AB SERPL-IMP: SIGNIFICANT CHANGE UP
HGB BLD-MCNC: 11.7 G/DL — LOW (ref 13–17)
IRON SATN MFR SERPL: 10 % — LOW (ref 16–55)
IRON SATN MFR SERPL: 18 UG/DL — LOW (ref 45–165)
MAGNESIUM SERPL-MCNC: 1.5 MG/DL — LOW (ref 1.6–2.6)
MCHC RBC-ENTMCNC: 29.2 PG — SIGNIFICANT CHANGE UP (ref 27–34)
MCHC RBC-ENTMCNC: 33.6 GM/DL — SIGNIFICANT CHANGE UP (ref 32–36)
MCV RBC AUTO: 86.8 FL — SIGNIFICANT CHANGE UP (ref 80–100)
NRBC # BLD: 0 /100 WBCS — SIGNIFICANT CHANGE UP (ref 0–0)
PLATELET # BLD AUTO: 306 K/UL — SIGNIFICANT CHANGE UP (ref 150–400)
POTASSIUM SERPL-MCNC: 3.5 MMOL/L — SIGNIFICANT CHANGE UP (ref 3.5–5.3)
POTASSIUM SERPL-SCNC: 3.5 MMOL/L — SIGNIFICANT CHANGE UP (ref 3.5–5.3)
RBC # BLD: 4.01 M/UL — LOW (ref 4.2–5.8)
RBC # FLD: 12.1 % — SIGNIFICANT CHANGE UP (ref 10.3–14.5)
SODIUM SERPL-SCNC: 140 MMOL/L — SIGNIFICANT CHANGE UP (ref 135–145)
TIBC SERPL-MCNC: 181 UG/DL — LOW (ref 220–430)
UIBC SERPL-MCNC: 163 UG/DL — SIGNIFICANT CHANGE UP (ref 110–370)
VANCOMYCIN TROUGH SERPL-MCNC: 42.8 UG/ML — CRITICAL HIGH (ref 10–20)
WBC # BLD: 4.09 K/UL — SIGNIFICANT CHANGE UP (ref 3.8–10.5)
WBC # FLD AUTO: 4.09 K/UL — SIGNIFICANT CHANGE UP (ref 3.8–10.5)

## 2020-01-25 PROCEDURE — 99233 SBSQ HOSP IP/OBS HIGH 50: CPT | Mod: GC

## 2020-01-25 RX ORDER — AMLODIPINE BESYLATE 2.5 MG/1
10 TABLET ORAL EVERY 24 HOURS
Refills: 0 | Status: DISCONTINUED | OUTPATIENT
Start: 2020-01-25 | End: 2020-01-27

## 2020-01-25 RX ORDER — POTASSIUM CHLORIDE 20 MEQ
40 PACKET (EA) ORAL ONCE
Refills: 0 | Status: COMPLETED | OUTPATIENT
Start: 2020-01-25 | End: 2020-01-25

## 2020-01-25 RX ADMIN — Medication 40 MILLIEQUIVALENT(S): at 09:52

## 2020-01-25 RX ADMIN — SODIUM CHLORIDE 100 MILLILITER(S): 9 INJECTION INTRAMUSCULAR; INTRAVENOUS; SUBCUTANEOUS at 22:15

## 2020-01-25 RX ADMIN — AMLODIPINE BESYLATE 10 MILLIGRAM(S): 2.5 TABLET ORAL at 05:41

## 2020-01-25 RX ADMIN — Medication 650 MILLIGRAM(S): at 17:14

## 2020-01-25 RX ADMIN — Medication 650 MILLIGRAM(S): at 05:40

## 2020-01-25 RX ADMIN — Medication 650 MILLIGRAM(S): at 06:40

## 2020-01-25 RX ADMIN — Medication 75 MILLIGRAM(S): at 05:41

## 2020-01-25 RX ADMIN — Medication 300 MILLIGRAM(S): at 13:08

## 2020-01-25 RX ADMIN — Medication 75 MILLIGRAM(S): at 17:15

## 2020-01-25 RX ADMIN — Medication 300 MILLIGRAM(S): at 01:57

## 2020-01-25 NOTE — PROGRESS NOTE ADULT - PROBLEM SELECTOR PLAN 4
Pt with hx of HTN, on norvasc 10mg QD and losartan 100mg QD  - c/w home norvasc  - hold losartan given rosaura

## 2020-01-25 NOTE — PROGRESS NOTE ADULT - PROBLEM SELECTOR PLAN 5
Pt with elevated Cr on previous ED visit to 1.33, now improved to 1.28. Unknown baseline. Likely prerenal 2/2 sepsis however pt with hx of R partial nephrectomy 2/2 unspecified renal carcinoma.   - f/u urine lytes   - trend cr with daily BMP  - Hold losartan given AJAY

## 2020-01-25 NOTE — PROGRESS NOTE ADULT - PROBLEM SELECTOR PLAN 1
Pt meeting 2/4 SIRS (fever 102 and HR>90) presents with worsening LLE rash and diagnosed with cellulitis 3 days ago, Initially discharged on Doxy and cefadroxil. Pt now presents with worsened swelling and fevers. S/p 2.5 L NS in ED as well as vancomycin and unasyn. SIRS positive may be due to celluitis or influenza A, plan is to treat both conditions.  - c/w vancomycin for cellulitis, tamiflu for Influenza A   - blood and urine cx (1/22) NGTD, BCx from this admission NGTD  - CRP 70 on labs today, CT LLE to r/o abscess or osteomyelitis underlying area of cellulitis

## 2020-01-25 NOTE — PROGRESS NOTE ADULT - SUBJECTIVE AND OBJECTIVE BOX
OVERNIGHT EVENTS: No acute events reported overnight    SUBJECTIVE / INTERVAL HPI: Patient seen and examined at bedside. Resting in bed. Patient reports he continues to have diarrhea and nausea, but reports he is able to tolerate PO intake. As per patient, his symptoms are mildly improved from yesterday. Mild headache. With respect to his cellulitis, patient reports affected area is smaller, however he continues to endores mild pain which is currently tolerable and he does not desire to be treated with narcotics.     VITAL SIGNS:  Vital Signs Last 24 Hrs  T(C): 38.2 (25 Jan 2020 05:37), Max: 39.5 (24 Jan 2020 17:41)  T(F): 100.8 (25 Jan 2020 05:37), Max: 103.1 (24 Jan 2020 17:41)  HR: 103 (25 Jan 2020 05:37) (99 - 115)  BP: 169/98 (25 Jan 2020 05:37) (138/79 - 169/98)  RR: 20 (25 Jan 2020 05:37) (16 - 20)  SpO2: 95% (25 Jan 2020 05:37) (95% - 97%)    PHYSICAL EXAM:  General: WDWN, NAD  HEENT: NC/AT; PERRL, anicteric sclera; MMM  Neck: supple, no JVD  Cardiovascular: +S1/S2; RRR, no M/G/R, no orthopnea on standing, no s/s of orthostasis on standing  Respiratory: CTA B/L; no W/R/R  Gastrointestinal: soft, nontender and nondistended, no guarding, no masses appreciated  Extremities: WWP; area of erythema and edema of the LLE consistent with cellulitis, area is smaller than marker outline, suggesting improvement in infection  Vascular: 2+ radial, DP/PT pulses B/L  Neurological: AAOx3; gait normal, no FND appreciated on exam    MEDICATIONS:  MEDICATIONS  (STANDING):  amLODIPine   Tablet 10 milliGRAM(s) Oral every 24 hours  influenza   Vaccine 0.5 milliLiter(s) IntraMuscular once  oseltamivir 75 milliGRAM(s) Oral two times a day  potassium chloride    Tablet ER 40 milliEquivalent(s) Oral once  sodium chloride 0.9%. 1000 milliLiter(s) (100 mL/Hr) IV Continuous <Continuous>  vancomycin  IVPB 1500 milliGRAM(s) IV Intermittent every 12 hours    MEDICATIONS  (PRN):  acetaminophen   Tablet .. 650 milliGRAM(s) Oral every 6 hours PRN Temp greater or equal to 38C (100.4F)      ALLERGIES: NKDA    LABS:             11.7   4.09  )-----------( 306      ( 25 Jan 2020 06:30 )             34.8     140  |  103  |  6<L>  ----------------------------<  91  3.5   |  22  |  0.97    Ca    8.6      25 Jan 2020 06:30  Mg     1.5     01-25    TPro  7.5  /  Alb  3.1<L>  /  TBili  0.4  /  DBili  x   /  AST  22  /  ALT  35  /  AlkPhos  89  01-24    RADIOLOGY & ADDITIONAL TESTS: Reviewed.

## 2020-01-25 NOTE — PROGRESS NOTE ADULT - ASSESSMENT
A 55 yo M with h/o HTN (on amlodipine), R kidney cancer s/p partial nephrectomy 2014. Patient presented to [facility] ED 3 days ago with symptoms of LLE pain, swelling and redness, and was discharged on PO antibiotics, now with worsening cellulitis and influenza A positive. Admitted for further evaluation and treatment.

## 2020-01-25 NOTE — PROGRESS NOTE ADULT - ATTENDING COMMENTS
Pt seen and examined at bedside    Agree with A and P as above    1) cellulitis - now improving on Vanc  given elevated ESR, f/u CT r/o osteo  blood cx negative to date    2) flu - c/w tamiflu, supportive care    3) HTN - chronic, mildly above goal  holding home ace in setting sepsis, resolving AJAY  continue to monitor

## 2020-01-26 LAB
ANION GAP SERPL CALC-SCNC: 15 MMOL/L — SIGNIFICANT CHANGE UP (ref 5–17)
BUN SERPL-MCNC: 7 MG/DL — SIGNIFICANT CHANGE UP (ref 7–23)
CALCIUM SERPL-MCNC: 8.6 MG/DL — SIGNIFICANT CHANGE UP (ref 8.4–10.5)
CHLORIDE SERPL-SCNC: 101 MMOL/L — SIGNIFICANT CHANGE UP (ref 96–108)
CO2 SERPL-SCNC: 23 MMOL/L — SIGNIFICANT CHANGE UP (ref 22–31)
CREAT SERPL-MCNC: 1.02 MG/DL — SIGNIFICANT CHANGE UP (ref 0.5–1.3)
CRP SERPL-MCNC: 6.17 MG/DL — HIGH (ref 0–0.4)
GLUCOSE SERPL-MCNC: 90 MG/DL — SIGNIFICANT CHANGE UP (ref 70–99)
HCT VFR BLD CALC: 37.2 % — LOW (ref 39–50)
HGB BLD-MCNC: 12.5 G/DL — LOW (ref 13–17)
MAGNESIUM SERPL-MCNC: 1.6 MG/DL — SIGNIFICANT CHANGE UP (ref 1.6–2.6)
MCHC RBC-ENTMCNC: 29.3 PG — SIGNIFICANT CHANGE UP (ref 27–34)
MCHC RBC-ENTMCNC: 33.6 GM/DL — SIGNIFICANT CHANGE UP (ref 32–36)
MCV RBC AUTO: 87.1 FL — SIGNIFICANT CHANGE UP (ref 80–100)
NRBC # BLD: 0 /100 WBCS — SIGNIFICANT CHANGE UP (ref 0–0)
PLATELET # BLD AUTO: 333 K/UL — SIGNIFICANT CHANGE UP (ref 150–400)
POTASSIUM SERPL-MCNC: 3.8 MMOL/L — SIGNIFICANT CHANGE UP (ref 3.5–5.3)
POTASSIUM SERPL-SCNC: 3.8 MMOL/L — SIGNIFICANT CHANGE UP (ref 3.5–5.3)
RBC # BLD: 4.27 M/UL — SIGNIFICANT CHANGE UP (ref 4.2–5.8)
RBC # FLD: 12.1 % — SIGNIFICANT CHANGE UP (ref 10.3–14.5)
SODIUM SERPL-SCNC: 139 MMOL/L — SIGNIFICANT CHANGE UP (ref 135–145)
VANCOMYCIN TROUGH SERPL-MCNC: 14.9 UG/ML — SIGNIFICANT CHANGE UP (ref 10–20)
WBC # BLD: 4.75 K/UL — SIGNIFICANT CHANGE UP (ref 3.8–10.5)
WBC # FLD AUTO: 4.75 K/UL — SIGNIFICANT CHANGE UP (ref 3.8–10.5)

## 2020-01-26 PROCEDURE — 99233 SBSQ HOSP IP/OBS HIGH 50: CPT | Mod: GC

## 2020-01-26 RX ORDER — VANCOMYCIN HCL 1 G
1500 VIAL (EA) INTRAVENOUS EVERY 12 HOURS
Refills: 0 | Status: COMPLETED | OUTPATIENT
Start: 2020-01-26 | End: 2020-01-27

## 2020-01-26 RX ORDER — VANCOMYCIN HCL 1 G
1250 VIAL (EA) INTRAVENOUS EVERY 12 HOURS
Refills: 0 | Status: DISCONTINUED | OUTPATIENT
Start: 2020-01-26 | End: 2020-01-26

## 2020-01-26 RX ORDER — MAGNESIUM SULFATE 500 MG/ML
2 VIAL (ML) INJECTION ONCE
Refills: 0 | Status: COMPLETED | OUTPATIENT
Start: 2020-01-26 | End: 2020-01-26

## 2020-01-26 RX ORDER — POTASSIUM CHLORIDE 20 MEQ
40 PACKET (EA) ORAL ONCE
Refills: 0 | Status: COMPLETED | OUTPATIENT
Start: 2020-01-26 | End: 2020-01-26

## 2020-01-26 RX ADMIN — Medication 75 MILLIGRAM(S): at 17:13

## 2020-01-26 RX ADMIN — AMLODIPINE BESYLATE 10 MILLIGRAM(S): 2.5 TABLET ORAL at 05:26

## 2020-01-26 RX ADMIN — Medication 75 MILLIGRAM(S): at 05:26

## 2020-01-26 RX ADMIN — Medication 40 MILLIEQUIVALENT(S): at 10:44

## 2020-01-26 RX ADMIN — Medication 300 MILLIGRAM(S): at 22:40

## 2020-01-26 RX ADMIN — Medication 300 MILLIGRAM(S): at 10:44

## 2020-01-26 NOTE — PROGRESS NOTE ADULT - SUBJECTIVE AND OBJECTIVE BOX
Pt seen and examined at bedside.  Reports feeling better    Vital Signs Last 24 Hrs  T(C): 36.7 (26 Jan 2020 05:51), Max: 37.8 (25 Jan 2020 17:26)  T(F): 98 (26 Jan 2020 05:51), Max: 100 (25 Jan 2020 17:26)  HR: 93 (26 Jan 2020 05:51) (89 - 105)  BP: 155/94 (26 Jan 2020 05:51) (146/95 - 155/94)  BP(mean): --  RR: 18 (26 Jan 2020 05:51) (18 - 18)  SpO2: 94% (26 Jan 2020 05:51) (94% - 97%)    AA and O x 3 NAD  NCAT  neck supple  s1s2 RRR  lungs CTA b/l  abd soft NTND  ext no edema. decreased erythema of LLE, tenderness improved  skin warm  no focal deficits    Complete Blood Count (01.26.20 @ 07:48)    Nucleated RBC: 0 /100 WBCs    WBC Count: 4.75 K/uL    RBC Count: 4.27 M/uL    Hemoglobin: 12.5 g/dL    Hematocrit: 37.2 %    Mean Cell Volume: 87.1 fl    Mean Cell Hemoglobin: 29.3 pg    Mean Cell Hemoglobin Conc: 33.6 gm/dL    Red Cell Distrib Width: 12.1 %    Platelet Count - Automated: 333 K/uL    Basic Metabolic Panel (01.26.20 @ 07:48)    Sodium, Serum: 139 mmol/L    Potassium, Serum: 3.8 mmol/L    Chloride, Serum: 101 mmol/L    Carbon Dioxide, Serum: 23 mmol/L    Anion Gap, Serum: 15 mmol/L    Blood Urea Nitrogen, Serum: 7 mg/dL    Creatinine, Serum: 1.02 mg/dL    Glucose, Serum: 90 mg/dL    Calcium, Total Serum: 8.6 mg/dL    eGFR if Non : 82: The units for eGFR are ml/min/1.73m2 (normalized body surface area). The  eGFR is calculated from a serum creatinine using the CKD-EPI equation.  Other variables required for calculation are race, age and sex. Among  patients with chronic kidney disease (CKD), the eGFR is useful in  determining the stage of disease according to KDOQI CKD classification.  All eGFR results are reported numerically with the following  interpretation.          GFR                    With                        Without     (ml/min/1.73 m2)    Kidney Damage       Kidney Damage        >= 90                    Stage 1                     Normal        60-89                    Stage 2                     Decreased GFR        30-59                    Stage 3         Stage 3        15-29                    Stage 4                     Stage 4        < 15                      Stage 5                     Stage 5  Each stage of CKD assumes that the associated GFR level has been in  effect for at least 3 months. Determination of stages one and two (with  eGFR > 59 ml/min/m2) requires estimation of kidney damage for at least 3  months as defined by structural or functional abnormalities.  Limitations: All estimates of GFR will be less accurate for patientsat  extremes of muscle mass (including but not limited to frail elderly,  critically ill, or cancer patients), those with unusual diets, and those  with conditions associated with reduced secretion or extrarenal  elimination of creatinine. The eGFR equation is not recommended for use  in patients with unstable creatinine levels. mL/min/1.73M2    eGFR if : 95: The units for eGFR are ml/min/1.73m2 (normalized body surface area). The  eGFR is calculated from a serum creatinine using the CKD-EPI equation.  Other variables required for calculation are race, age and sex. Among  patients with chronic kidney disease (CKD), the eGFR is useful in  determining the stage of disease according to KDOQI CKD classification.  All eGFR results are reported numerically with the following  interpretation.          GFR                    With                        Without     (ml/min/1.73 m2)    Kidney Damage       Kidney Damage        >= 90                    Stage 1                     Normal        60-89                    Stage 2                     Decreased GFR        30-59                    Stage 3         Stage 3        15-29                    Stage 4                     Stage 4        < 15                      Stage 5                     Stage 5  Each stage of CKD assumes that the associated GFR level has been in  effect for at least 3 months. Determination of stages one and two (with  eGFR > 59 ml/min/m2) requires estimation of kidney damage for at least 3  months as defined by structural or functional abnormalities.  Limitations: All estimates of GFR will be less accurate for patientsat  extremes of muscle mass (including but not limited to frail elderly,  critically ill, or cancer patients), those with unusual diets, and those  with conditions associated with reduced secretion or extrarenal  elimination of creatinine. The eGFR equation is not recommended for use  in patients with unstable creatinine levels. mL/min/1.73M2

## 2020-01-26 NOTE — PROGRESS NOTE ADULT - PROBLEM SELECTOR PLAN 1
SIRS now resolved  - c/w vancomycin for cellulitis, tamiflu for Influenza A   - blood and urine cx (1/22) NGTD, BCx from this admission NGTD  - CRP 70 on labs, possibly 2/2 multiple infections.  consider CT LLE to r/o abscess or osteomyelitis underlying area of cellulitis.

## 2020-01-26 NOTE — PROGRESS NOTE ADULT - ASSESSMENT
A 55 yo M with h/o HTN (on amlodipine), R kidney cancer s/p partial nephrectomy 2014. Patient presented to [facility] ED 3 days ago with symptoms of LLE pain, swelling and redness, failed PO antibiotics, with cellulitis and influenza A positive.

## 2020-01-26 NOTE — PROGRESS NOTE ADULT - PROBLEM SELECTOR PLAN 5
Pt with elevated Cr on previous ED visit to 1.33, now improved to 1.28. Unknown baseline. Likely prerenal 2/2 sepsis however pt with hx of R partial nephrectomy 2/2 unspecified renal carcinoma.   - f/u urine lytes   - trend cr with daily BMP  - consider restart ace tomorrow

## 2020-01-27 ENCOUNTER — TRANSCRIPTION ENCOUNTER (OUTPATIENT)
Age: 55
End: 2020-01-27

## 2020-01-27 VITALS
TEMPERATURE: 99 F | RESPIRATION RATE: 16 BRPM | HEART RATE: 83 BPM | SYSTOLIC BLOOD PRESSURE: 128 MMHG | DIASTOLIC BLOOD PRESSURE: 81 MMHG | OXYGEN SATURATION: 94 %

## 2020-01-27 LAB
ANION GAP SERPL CALC-SCNC: 11 MMOL/L — SIGNIFICANT CHANGE UP (ref 5–17)
BUN SERPL-MCNC: 9 MG/DL — SIGNIFICANT CHANGE UP (ref 7–23)
CALCIUM SERPL-MCNC: 9.3 MG/DL — SIGNIFICANT CHANGE UP (ref 8.4–10.5)
CHLORIDE SERPL-SCNC: 103 MMOL/L — SIGNIFICANT CHANGE UP (ref 96–108)
CO2 SERPL-SCNC: 27 MMOL/L — SIGNIFICANT CHANGE UP (ref 22–31)
CREAT SERPL-MCNC: 1.18 MG/DL — SIGNIFICANT CHANGE UP (ref 0.5–1.3)
CULTURE RESULTS: SIGNIFICANT CHANGE UP
CULTURE RESULTS: SIGNIFICANT CHANGE UP
GLUCOSE SERPL-MCNC: 96 MG/DL — SIGNIFICANT CHANGE UP (ref 70–99)
HCT VFR BLD CALC: 39.1 % — SIGNIFICANT CHANGE UP (ref 39–50)
HGB BLD-MCNC: 13.1 G/DL — SIGNIFICANT CHANGE UP (ref 13–17)
MAGNESIUM SERPL-MCNC: 1.8 MG/DL — SIGNIFICANT CHANGE UP (ref 1.6–2.6)
MCHC RBC-ENTMCNC: 29.2 PG — SIGNIFICANT CHANGE UP (ref 27–34)
MCHC RBC-ENTMCNC: 33.5 GM/DL — SIGNIFICANT CHANGE UP (ref 32–36)
MCV RBC AUTO: 87.1 FL — SIGNIFICANT CHANGE UP (ref 80–100)
NRBC # BLD: 0 /100 WBCS — SIGNIFICANT CHANGE UP (ref 0–0)
PLATELET # BLD AUTO: 356 K/UL — SIGNIFICANT CHANGE UP (ref 150–400)
POTASSIUM SERPL-MCNC: 4.8 MMOL/L — SIGNIFICANT CHANGE UP (ref 3.5–5.3)
POTASSIUM SERPL-SCNC: 4.8 MMOL/L — SIGNIFICANT CHANGE UP (ref 3.5–5.3)
RBC # BLD: 4.49 M/UL — SIGNIFICANT CHANGE UP (ref 4.2–5.8)
RBC # FLD: 11.9 % — SIGNIFICANT CHANGE UP (ref 10.3–14.5)
SODIUM SERPL-SCNC: 141 MMOL/L — SIGNIFICANT CHANGE UP (ref 135–145)
SPECIMEN SOURCE: SIGNIFICANT CHANGE UP
SPECIMEN SOURCE: SIGNIFICANT CHANGE UP
WBC # BLD: 5.66 K/UL — SIGNIFICANT CHANGE UP (ref 3.8–10.5)
WBC # FLD AUTO: 5.66 K/UL — SIGNIFICANT CHANGE UP (ref 3.8–10.5)

## 2020-01-27 PROCEDURE — 96376 TX/PRO/DX INJ SAME DRUG ADON: CPT | Mod: 3

## 2020-01-27 PROCEDURE — 83550 IRON BINDING TEST: CPT

## 2020-01-27 PROCEDURE — 86803 HEPATITIS C AB TEST: CPT

## 2020-01-27 PROCEDURE — 85025 COMPLETE CBC W/AUTO DIFF WBC: CPT

## 2020-01-27 PROCEDURE — 87631 RESP VIRUS 3-5 TARGETS: CPT

## 2020-01-27 PROCEDURE — 80202 ASSAY OF VANCOMYCIN: CPT

## 2020-01-27 PROCEDURE — 82728 ASSAY OF FERRITIN: CPT

## 2020-01-27 PROCEDURE — 36415 COLL VENOUS BLD VENIPUNCTURE: CPT

## 2020-01-27 PROCEDURE — 99285 EMERGENCY DEPT VISIT HI MDM: CPT | Mod: 25

## 2020-01-27 PROCEDURE — 96374 THER/PROPH/DIAG INJ IV PUSH: CPT

## 2020-01-27 PROCEDURE — 86140 C-REACTIVE PROTEIN: CPT

## 2020-01-27 PROCEDURE — 96375 TX/PRO/DX INJ NEW DRUG ADDON: CPT | Mod: 3

## 2020-01-27 PROCEDURE — 83605 ASSAY OF LACTIC ACID: CPT

## 2020-01-27 PROCEDURE — 80048 BASIC METABOLIC PNL TOTAL CA: CPT

## 2020-01-27 PROCEDURE — 87040 BLOOD CULTURE FOR BACTERIA: CPT

## 2020-01-27 PROCEDURE — 71045 X-RAY EXAM CHEST 1 VIEW: CPT

## 2020-01-27 PROCEDURE — 85027 COMPLETE CBC AUTOMATED: CPT

## 2020-01-27 PROCEDURE — 99239 HOSP IP/OBS DSCHRG MGMT >30: CPT | Mod: GC

## 2020-01-27 PROCEDURE — 80053 COMPREHEN METABOLIC PANEL: CPT

## 2020-01-27 PROCEDURE — 83735 ASSAY OF MAGNESIUM: CPT

## 2020-01-27 PROCEDURE — 85652 RBC SED RATE AUTOMATED: CPT

## 2020-01-27 PROCEDURE — 83540 ASSAY OF IRON: CPT

## 2020-01-27 RX ORDER — AMLODIPINE BESYLATE 2.5 MG/1
1 TABLET ORAL
Qty: 0 | Refills: 0 | DISCHARGE
Start: 2020-01-27

## 2020-01-27 RX ORDER — AZTREONAM 2 G
1 VIAL (EA) INJECTION
Qty: 6 | Refills: 0
Start: 2020-01-27 | End: 2020-01-29

## 2020-01-27 RX ADMIN — Medication 300 MILLIGRAM(S): at 10:20

## 2020-01-27 RX ADMIN — AMLODIPINE BESYLATE 10 MILLIGRAM(S): 2.5 TABLET ORAL at 06:08

## 2020-01-27 RX ADMIN — Medication 75 MILLIGRAM(S): at 06:08

## 2020-01-27 NOTE — DISCHARGE NOTE PROVIDER - HOSPITAL COURSE
54M PMH of HTN on amlodipine, rt. kidney cancer s/p partial nephrectomy in 2014    Presented with worsening cellulitis after treatment with doxycycline and cefadroxil from the ED 3 days prior to discharge    Problem List/Main Diagnoses (system-based):     1. Influenza - Patient reported starting to have flu like symptoms 1 day following discharge from the ED. Nasal swab in the ED was positive for influenza A. Patient started on tamiflu and was able to tolerate PO food and liquids. Discharged for further treatment as an outpatient.        2. Cellulitis - Patient initially discharged from the ED on doxycycline and cefadroxil 3 days prior to return. Cellulitis did not improve. Patient started on IV vancomycin with improvement of symptoms, and was then transitioned to PO bactrim for management as an outpatient.        New medications: Oseltamivir 75 mg PO BID x 3 days, Bactrim DS BID x 3 days    Labs to be followed outpatient: None    Exam to be followed outpatient: None

## 2020-01-27 NOTE — DISCHARGE NOTE PROVIDER - NSDCCPCAREPLAN_GEN_ALL_CORE_FT
PRINCIPAL DISCHARGE DIAGNOSIS  Diagnosis: Cellulitis of lower leg  Assessment and Plan of Treatment: You were admitted with a diagnosis of cellulitis. This is an infection of one of the layers of skin, which typically manifests with pain and warmth of the infected area. While this condition is potentially very serious, it is treatable with antibiotics. Following discharge, we prescribed you an antibiotic called bactrim to be taken for the next 3 days. Plese take all prescribed doses of this medication following your discharge.  If the infected area becomes larger, or becomes significantly more painful, or if a discharge develops, please call your doctor or go to the emergency room as this may indicate a worsening in your clinical condition and may require further intervention and treatment. Please schedule a follow up appointment with your primary care physician for ongoing care.      SECONDARY DISCHARGE DIAGNOSES  Diagnosis: Influenza A  Assessment and Plan of Treatment: You were found to have influenza A, more commonly known as the flu, during your hospital admission. This was treated with a medication called tamiflu. Please allow this disease to run its course, as it generally resolves with your bodies own immune system within 1-2 weeks. In the future, similar infections can be prevented by recieving an anual flu vaccination. Please wash your hands as much as possible, and minimize your time in crowded public places in order to minimize the risk of infecting others.   If you become unable to keep food or liquids down, or develop a signinficantly worsening fever, please see your doctor or return to the ED for further evaluation.

## 2020-01-27 NOTE — DISCHARGE NOTE PROVIDER - NSDCFUADDAPPT_GEN_ALL_CORE_FT
Please follow up with your PCP for ongoing evaluation and treatment within 2 weeks of discharge from the hospital.

## 2020-01-27 NOTE — DISCHARGE NOTE PROVIDER - NSDCMRMEDTOKEN_GEN_ALL_CORE_FT
amLODIPine 10 mg oral tablet: 1 tab(s) orally every 24 hours  Bactrim  mg-160 mg oral tablet: 1 tab(s) orally 2 times a day   oseltamivir 75 mg oral capsule: 1 cap(s) orally 2 times a day

## 2020-01-27 NOTE — DISCHARGE NOTE PROVIDER - NSDCCAREPROVSEEN_GEN_ALL_CORE_FT
Hal Weinstein - Medicine resident PGY1  Mary Llamas - Medicine resident PGY3  Trang Leary - Medicine attending

## 2020-01-27 NOTE — DISCHARGE NOTE NURSING/CASE MANAGEMENT/SOCIAL WORK - PATIENT PORTAL LINK FT
You can access the FollowMyHealth Patient Portal offered by [facility] by registering at the following website: [email or web address]. By joining "[email or web address]’s FollowMyHealth portal, you will also be able to view your health information using other applications (apps) compatible with our system.

## 2020-01-29 LAB
CULTURE RESULTS: SIGNIFICANT CHANGE UP
CULTURE RESULTS: SIGNIFICANT CHANGE UP
SPECIMEN SOURCE: SIGNIFICANT CHANGE UP
SPECIMEN SOURCE: SIGNIFICANT CHANGE UP

## 2020-01-31 DIAGNOSIS — L03.116 CELLULITIS OF LEFT LOWER LIMB: ICD-10-CM

## 2020-01-31 DIAGNOSIS — J09.X2 INFLUENZA DUE TO IDENTIFIED NOVEL INFLUENZA A VIRUS WITH OTHER RESPIRATORY MANIFESTATIONS: ICD-10-CM

## 2020-01-31 DIAGNOSIS — I10 ESSENTIAL (PRIMARY) HYPERTENSION: ICD-10-CM

## 2020-01-31 DIAGNOSIS — Z85.528 PERSONAL HISTORY OF OTHER MALIGNANT NEOPLASM OF KIDNEY: ICD-10-CM

## 2020-01-31 DIAGNOSIS — Z90.5 ACQUIRED ABSENCE OF KIDNEY: ICD-10-CM

## 2020-01-31 DIAGNOSIS — A41.9 SEPSIS, UNSPECIFIED ORGANISM: ICD-10-CM

## 2020-02-01 DIAGNOSIS — L03.116 CELLULITIS OF LEFT LOWER LIMB: ICD-10-CM

## 2020-02-01 DIAGNOSIS — A41.9 SEPSIS, UNSPECIFIED ORGANISM: ICD-10-CM

## 2020-02-01 DIAGNOSIS — M79.89 OTHER SPECIFIED SOFT TISSUE DISORDERS: ICD-10-CM

## 2020-02-01 DIAGNOSIS — R05 COUGH: ICD-10-CM

## 2020-02-01 DIAGNOSIS — Z79.899 OTHER LONG TERM (CURRENT) DRUG THERAPY: ICD-10-CM

## 2020-02-01 DIAGNOSIS — I10 ESSENTIAL (PRIMARY) HYPERTENSION: ICD-10-CM

## 2020-02-06 ENCOUNTER — TRANSCRIPTION ENCOUNTER (OUTPATIENT)
Age: 55
End: 2020-02-06
